# Patient Record
Sex: FEMALE | Race: WHITE
[De-identification: names, ages, dates, MRNs, and addresses within clinical notes are randomized per-mention and may not be internally consistent; named-entity substitution may affect disease eponyms.]

---

## 2017-10-25 ENCOUNTER — HOSPITAL ENCOUNTER (OUTPATIENT)
Dept: HOSPITAL 61 - KCIC MAMMO | Age: 47
Discharge: HOME | End: 2017-10-25
Attending: OBSTETRICS & GYNECOLOGY
Payer: COMMERCIAL

## 2017-10-25 DIAGNOSIS — Z12.31: Primary | ICD-10-CM

## 2017-10-25 PROCEDURE — 77063 BREAST TOMOSYNTHESIS BI: CPT

## 2017-10-26 NOTE — [facility]
DATE: 10/25/2017



EXAM: MAMMO LAURI SCREENING BILATERAL



HISTORY: Routine screening



COMPARISON: 10/25/2016



This study was interpreted with the benefit of Computerized Aided Detection

(CAD).







FINDINGS:

Breast Density:  DENSE  The breast Parenchyma is dense, which could reduce the

sensitivity of mammography. Breast parenchyma level density D..  There are no

dominant suspicious masses, suspicious microcalcifications or evidence of

architectural distortion.



 Intramammary lymph node identified in the right upper breast.  







IMPRESSION: Benign findings







BI-RADS CATEGORY: 2 BENIGN FINDING



RECOMMENDED FOLLOW-UP: 12M 12 MONTH FOLLOW-UP



PQRS compliance statement: Patient information was entered into a reminder

system with a target due date 10/25/2018 for the next mammogram.



Mammography is a sensitive method for finding small breast cancers, but it

does not detect them all and is not a substitute for careful clinical

examination.  A negative mammogram does not negate a clinically suspicious

finding and should not result in delay in biopsying a clinically suspicious

abnormality.



"Our facility is accredited by the [employer] Mammography

Program."

## 2019-11-22 ENCOUNTER — HOSPITAL ENCOUNTER (OUTPATIENT)
Dept: HOSPITAL 61 - KCIC MAMMO | Age: 49
Discharge: HOME | End: 2019-11-22
Attending: OBSTETRICS & GYNECOLOGY
Payer: COMMERCIAL

## 2019-11-22 DIAGNOSIS — N64.89: ICD-10-CM

## 2019-11-22 DIAGNOSIS — Z12.31: Primary | ICD-10-CM

## 2019-11-22 PROCEDURE — 77067 SCR MAMMO BI INCL CAD: CPT

## 2019-11-22 NOTE — [facility]
Bilateral digital screening mammograms:

 

Reason for examination: Routine screening.

 

Comparison is made to previous studies dated 10/25/2017 and 10/25/2016.

 

Interpretation was made with the benefit of CAD.

 

The skin and nipples show no abnormalities. No abnormal axillary lymph 

nodes are seen. The breast parenchyma is extremely dense. (Breast density:

Category D.) There are no dominant masses, suspicious calcifications or 

architectural distortion. A couple of benign calcifications are present.

 

Impression:

 

No evidence of malignancy. Recommend routine screening.

 

Your patient's mammogram demonstrates that she has dense breast tissue 

(breast density category C or D), which could hide abnormalities, and if 

she has other risk factors for breast cancer that have been identified, 

she might benefit from supplemental screening tests that may be suggested 

by you as her ordering physician. Dense breast tissue, in and of itself, 

is a relatively common condition. Therefore, this information is not 

provided to cause undue concern, but rather to raise your awareness and to

promote discussion with your patient regarding the presence of other risk 

factors, in addition to dense breast tissue. Your patient's mammography 

results will be sent to her. 

 

BI-RAD Category 2: Benign.

 

"Our facility is accredited by the [employer] 

Mammography Program."

 

This patient's information has been entered into a reminder system for the

patient to be notified with the results of her examination and a target 

date for the next mammogram.

 

Electronically signed by: Flores Ji MD (11/22/2019 9:24 AM) [id number]